# Patient Record
Sex: FEMALE | Race: WHITE
[De-identification: names, ages, dates, MRNs, and addresses within clinical notes are randomized per-mention and may not be internally consistent; named-entity substitution may affect disease eponyms.]

---

## 2019-03-31 ENCOUNTER — HOSPITAL ENCOUNTER (EMERGENCY)
Dept: HOSPITAL 7 - FB.ED | Age: 54
Discharge: HOME | End: 2019-03-31
Payer: COMMERCIAL

## 2019-03-31 DIAGNOSIS — M79.10: Primary | ICD-10-CM

## 2019-03-31 DIAGNOSIS — M25.50: ICD-10-CM

## 2019-03-31 PROCEDURE — 99282 EMERGENCY DEPT VISIT SF MDM: CPT

## 2019-03-31 PROCEDURE — 96372 THER/PROPH/DIAG INJ SC/IM: CPT

## 2019-03-31 NOTE — EDM.PDOC
ED HPI GENERAL MEDICAL PROBLEM





- General


Stated Complaint: FULL BODY PAIN


Time Seen by Provider: 03/31/19 11:15


Source of Information: Reports: Patient, Family


History Limitations: Reports: No Limitations





- History of Present Illness


INITIAL COMMENTS - FREE TEXT/NARRATIVE: 





c/o generalized pain





pt says she just moved to the area





on disability for mental health reasons, has not been taking all her meds, no SI

/HI, no hallucinations





has seen Dr Santos in past and plans to make an appointment to see him





here with sig other, no previous records in EHR





pt with complaint of "pain all over", says both hands are swollen altho appear 

WNL on exam





not working, no inc'd activities or injuries in past 24 hours





no f/c/d





- Related Data


Home Meds: 


 Home Meds





predniSONE 20 mg PO DAILY #5 tab 03/31/19 [Rx]











ED ROS GENERAL





- Review of Systems


Review Of Systems: See Below


Constitutional: Reports: Other (generalized pain)


HEENT: Reports: No Symptoms


Respiratory: Reports: No Symptoms


Cardiovascular: Reports: No Symptoms


Endocrine: Reports: No Symptoms


GI/Abdominal: Reports: No Symptoms


: Reports: No Symptoms


Musculoskeletal: Reports: No Symptoms


Skin: Reports: No Symptoms


Neurological: Reports: No Symptoms


Psychiatric: Reports: No Symptoms


Hematologic/Lymphatic: Reports: No Symptoms


Immunologic: Reports: No Symptoms





ED EXAM, GENERAL





- Physical Exam


Exam: See Below


Exam Limited By: No Limitations


General Appearance: Alert, WD/WN, No Apparent Distress


Nose: Normal Inspection


Throat/Mouth: Normal Inspection, Normal Lips, Normal Voice


Head: Atraumatic


Neck: Normal Inspection


Respiratory/Chest: No Respiratory Distress, Lungs Clear


Cardiovascular: Regular Rate, Rhythm


GI/Abdominal: Soft, Non-Tender


Back Exam: Normal Inspection, Full Range of Motion, NT


Extremities: Other (hands appear wnl, symmetric, no red, no warmth, no PT, no 

discrete swelling, good ROM)


Neurological: Alert, Oriented, CN II-XII Intact, Normal Cognition, No Motor/

Sensory Deficits


Psychiatric: Anxious


Skin Exam: Warm, Dry, Intact, Normal Color, No Rash


Lymphatic: No Adenopathy





Course





- Orders/Labs/Meds


Orders: 





 Active Orders 24 hr











 Category Date Time Status


 


 methylPREDNISolone Sod Succ [Solu-MEDROL] Med  03/31/19 11:26 Once





 125 mg IM ONETIME ONE   








 Medication Orders





Methylprednisolone Sodium Succinate (Solu-Medrol)  125 mg IM ONETIME ONE


   Stop: 03/31/19 11:27








Meds: 





Medications











Generic Name Dose Route Start Last Admin





  Trade Name Dave  PRN Reason Stop Dose Admin


 


Methylprednisolone Sodium Succinate  125 mg  03/31/19 11:26  





  Solu-Medrol  IM  03/31/19 11:27  





  ONETIME ONE   





     





     





     





     














Departure





- Departure


Time of Disposition: 11:27


Disposition: Home, Self-Care 01


Condition: Good


Clinical Impression: 


 Arthralgia, Myalgia








- Discharge Information


*PRESCRIPTION DRUG MONITORING PROGRAM REVIEWED*: Not Applicable


*COPY OF PRESCRIPTION DRUG MONITORING REPORT IN PATIENT WADE: Not Applicable


Prescriptions: 


predniSONE 20 mg PO DAILY #5 tab


Instructions:  Musculoskeletal Pain


Referrals: 


Narinder Santos MD [Primary Care Provider] - 


Additional Instructions: 


For muscle and joint pain, take prednisone 20 mg daily beginning today.





Do not mix Aleve and ibuprofen together.





It is probably best to take just the ibuprofen. You cannot take additional 

doses today. However, you can take ibuprofen 200 mg 3 tabs 4 times a day 

beginning tomorrow.





Also take acetaminophen 325 mg 2 tabs 4 times a day.





Use heat for 10 minutes 4 times a day.





See Dr Santos in the next week.





- My Orders


Last 24 Hours: 





My Active Orders





03/31/19 11:26


methylPREDNISolone Sod Succ [Solu-MEDROL]   125 mg IM ONETIME ONE 














- Assessment/Plan


Last 24 Hours: 





My Active Orders





03/31/19 11:26


methylPREDNISolone Sod Succ [Solu-MEDROL]   125 mg IM ONETIME ONE